# Patient Record
Sex: MALE | Race: WHITE
[De-identification: names, ages, dates, MRNs, and addresses within clinical notes are randomized per-mention and may not be internally consistent; named-entity substitution may affect disease eponyms.]

---

## 2021-05-05 ENCOUNTER — HOSPITAL ENCOUNTER (EMERGENCY)
Dept: HOSPITAL 76 - ED | Age: 21
LOS: 1 days | Discharge: HOME | End: 2021-05-06
Payer: COMMERCIAL
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
## 2021-05-05 DIAGNOSIS — F17.200: ICD-10-CM
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
## 2021-05-05 DIAGNOSIS — S60.011A: Primary | ICD-10-CM

## 2021-05-05 PROCEDURE — 99283 EMERGENCY DEPT VISIT LOW MDM: CPT

## 2021-05-05 PROCEDURE — 99281 EMR DPT VST MAYX REQ PHY/QHP: CPT

## 2021-05-06 VITALS — SYSTOLIC BLOOD PRESSURE: 121 MMHG | DIASTOLIC BLOOD PRESSURE: 73 MMHG

## 2021-05-06 NOTE — ED PHYSICIAN DOCUMENTATION
PD HPI UPPER EXT INJURY





- Stated complaint


Stated Complaint: R THUMB PX





- Chief complaint


Chief Complaint: Trauma Ext





- History obtained from


History obtained from: Patient





- History of Present Illness


Location: Right, Finger (thumb)


Type of injury: Blunt / blow


Where injury occurred: Work


Timing - onset: Enter  time (18:00), Today


Timing - details: Abrupt onset


Pain level now: 2


Worsened by: Palpating


Associated symptoms: Discolored


Recently seen: Not recently seen





- Additonal information


Additional information: 





patient is right hand dominant. Tonight at approximately 6 PM while at work, a 

tool he was using suddenly slipped as he was applying torque to it, causing it 

to strike his right thumb. c/o right thumb pain





Review of Systems


Musculoskeletal: reports: Extremity pain (right thumb)





PD PAST MEDICAL HISTORY





- Past Medical History


Past Medical History: No


Cardiovascular: None


Respiratory: None


Neuro: None


Endocrine/Autoimmune: None


GI: None


: None


HEENT: None


Psych: None


Musculoskeletal: None


Derm: None





- Past Surgical History


Past Surgical History: Yes


General: Other





- Present Medications


Home Medications: 


                                Ambulatory Orders











 Medication  Instructions  Recorded  Confirmed


 


No Known Home Medications  05/05/21 05/05/21














- Allergies


Allergies/Adverse Reactions: 


                                    Allergies











Allergy/AdvReac Type Severity Reaction Status Date / Time


 


No Known Drug Allergies Allergy   Verified 05/05/21 23:50














- Social History


Does the pt smoke?: Yes


Smoking Status: Current every day smoker


Does the pt drink ETOH?: Yes


Does the pt have substance abuse?: No





- Immunizations


Immunizations are current?: Yes





- POLST


Patient has POLST: No





PD ED PE NORMAL





- Vitals


Vital signs reviewed: Yes





- General


General: Alert and oriented X 3, No acute distress, Well developed/nourished





PD ED PE EXPANDED





- Extremities


Extremities: Tenderness, Other (right thumb tenderness over distal phalanx/tuft.

there is a small subungual hematoma that is limited to lunula)





Results





- Vitals


Vitals: 


                               Vital Signs - 24 hr











  05/05/21 05/06/21





  23:51 02:30


 


Temperature 36.7 C 


 


Heart Rate 71 68


 


Respiratory 18 15





Rate  


 


Blood Pressure 128/60 121/73


 


O2 Saturation 98 100








                                     Oxygen











O2 Source                      Room air

















- Rads (name of study)


  ** right hand xrays


Radiology: Prelim report reviewed, See rad report





PD MEDICAL DECISION MAKING





- ED course


Complexity details: reviewed results, considered differential, d/w patient


ED course: 





no fracture on xrays. he has intact ROM at IP and MCP of right thumb. there is a

small subungual hematoma which is too small to benefit from trephoning. Patient 

declines analgesics when offered.





Departure





- Departure


Disposition: 01 Home, Self Care


Clinical Impression: 


Thumb contusion


Qualifiers:


 Encounter type: initial encounter Damage to nail status: without damage 

Laterality: right Qualified Code(s): S60.011A - Contusion of right thumb without

damage to nail, initial encounter





Condition: Good


Instructions:  ED Contusion Finger


Discharge Date/Time: 05/06/21 02:31

## 2021-05-06 NOTE — XRAY REPORT
PROCEDURE:  Hand 3 View RT

 

INDICATIONS:  Trauma

 

TECHNIQUE:  3 views of the hand(s) acquired.  

 

COMPARISON:  None.

 

FINDINGS:  

 

Bones:  No fractures or dislocations.  No suspicious bony lesions.  

 

Soft tissues:  No suspicious soft tissue calcifications.  

 

IMPRESSION:  

No trauma found, no soft tissue swelling seen.

 

Reviewed by: Newton Flores MD on 5/6/2021 8:20 AM PDT

Approved by: Newton Flores MD on 5/6/2021 8:20 AM PDT

 

 

Station ID:  [id number]

## 2023-01-03 ENCOUNTER — HOSPITAL ENCOUNTER (EMERGENCY)
Dept: HOSPITAL 76 - ED | Age: 23
Discharge: HOME | End: 2023-01-03
Payer: COMMERCIAL

## 2023-01-03 VITALS — SYSTOLIC BLOOD PRESSURE: 131 MMHG | DIASTOLIC BLOOD PRESSURE: 80 MMHG
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
## 2023-01-03 DIAGNOSIS — F17.200: ICD-10-CM

## 2023-01-03 DIAGNOSIS — S01.81XA: Primary | ICD-10-CM

## 2023-01-03 PROCEDURE — 12011 RPR F/E/E/N/L/M 2.5 CM/<: CPT

## 2023-01-03 PROCEDURE — 99281 EMR DPT VST MAYX REQ PHY/QHP: CPT

## 2023-01-03 NOTE — ED PHYSICIAN DOCUMENTATION
History of Present Illness





- Stated complaint


Stated Complaint: CHIN LAC,CONGESTION,COUGH,SOA





- Chief complaint


Chief Complaint: Laceration





- History obtained from


History obtained from: Patient





- History of Present Illness


Timing: Today


Pain level max: 3


Pain level now: 1





- Additonal information


Additional information: 





Patient is a 22-year-old male who presents to the emergency department stating 

that he was at work today when he fell forward hitting his chin and causing a 

laceration.  He is [employer].  Tetanus is up-to-date.  No loss of 

consciousness.  No head, neck, back pain.  No numbness or tingling.  [facility] [employer] 

[facility] sent him here for evaluation.





Review of Systems


Constitutional: denies: Fever, Chills


Neurologic: denies: Confused, LOC





PD PAST MEDICAL HISTORY





- Past Medical History


Past Medical History: Yes


Cardiovascular: None


Respiratory: None


Neuro: None


Endocrine/Autoimmune: None


GI: None


: None


HEENT: None


Psych: ADD/ADHD


Musculoskeletal: None


Derm: None





- Past Surgical History


Past Surgical History: Yes


General: Other





- Present Medications


Home Medications: 


                                Ambulatory Orders











 Medication  Instructions  Recorded  Confirmed


 


Dextroamphetamine/Amphetamine 20 mg PO DAILY 01/03/23 01/03/23





[Adderall Xr 10 mg Capsule]   














- Allergies


Allergies/Adverse Reactions: 


                                    Allergies











Allergy/AdvReac Type Severity Reaction Status Date / Time


 


No Known Drug Allergies Allergy   Verified 01/03/23 12:14














- Social History


Does the pt smoke?: Yes


Smoking Status: Current every day smoker


Does the pt drink ETOH?: Yes


Does the pt have substance abuse?: No





- Immunizations


Immunizations are current?: Yes





- POLST


Patient has POLST: No





PD ED PE NORMAL





- Vitals


Vital signs reviewed: Yes





- General


General: Alert and oriented X 3, No acute distress





- HEENT


HEENT: Atraumatic, PERRL, Moist mucous membranes, Other (1 cm submental 

laceration.  Neurovascular intact)





- Neck


Neck: Supple, no meningeal sign, No bony TTP





- Cardiac


Cardiac: RRR





- Respiratory


Respiratory: No respiratory distress, Clear bilaterally





- Derm


Derm: Warm and dry





- Neuro


Neuro: Alert and oriented X 3, CNs 2-12 intact, No motor deficit, No sensory 

deficit, Normal speech


Eye Opening: Spontaneous


Motor: Obeys Commands


Verbal: Oriented


GCS Score: 15





Results





- Vitals


Vitals: 


                               Vital Signs - 24 hr











  01/03/23





  12:11


 


Temperature 36.5 C


 


Heart Rate 78


 


Respiratory 16





Rate 


 


Blood Pressure 131/80 H


 


O2 Saturation 100








                                     Oxygen











O2 Source                      Room air

















Procedures





- Laceration (location)


  ** Submental


Length in cm: 1


Wound type: Linear, Into subcut fat


Neurovascular status: Sensory intact, Motor intact, Vascular intact


Anesthesia: LET


Wound preparation: Irrigated copiously NS


Skin layer closure: Dermabond


Other: Patient tolerated well, No complications, Neurovascular intact, Tetanus 

UTD





PD Medical Decision Making





- ED course


Complexity details: considered differential, d/w patient


ED course: 





Patient with a chin laceration.  No intraoral injuries.  No neck pain.  No loss 

of consciousness.  Laceration repaired with Dermabond.  Tolerated well.  No 

further bleeding.  Tetanus up-to-date.  Warnings of infection and instructions 

on wound care given at bedside. Also counseled on how to minimize scarring.  

Patient counseled regarding signs and symptoms for which I believe and urgent 

re-evaluation would be necessary. Patient with good understanding of and 

agreement to plan and is comfortable going home at this time





This document was made in part using voice recognition software. While efforts 

are made to proofread this document, sound alike and grammatical errors may 

occur.





Departure





- Departure


Disposition: 01 Home, Self Care


Clinical Impression: 


Chin laceration


Qualifiers:


 Encounter type: initial encounter Qualified Code(s): S01.81XA - Laceration 

without foreign body of other part of head, initial encounter





Condition: Good


Instructions:  ED Laceration Facial Skin Glue


Follow-Up: 


your,doctor as needed [Other]


Comments: 


Keep the wound clean.  Do not scrub the area or apply ointment as this may 

dissolve the glue.  Return if you notice redness, swelling or drainage from the 

wound.


Discharge Date/Time: 01/03/23 16:33